# Patient Record
Sex: FEMALE | Race: WHITE | NOT HISPANIC OR LATINO | URBAN - METROPOLITAN AREA
[De-identification: names, ages, dates, MRNs, and addresses within clinical notes are randomized per-mention and may not be internally consistent; named-entity substitution may affect disease eponyms.]

---

## 2020-04-21 ENCOUNTER — EMERGENCY (EMERGENCY)
Facility: HOSPITAL | Age: 50
LOS: 0 days | Discharge: HOME | End: 2020-04-21
Attending: EMERGENCY MEDICINE
Payer: COMMERCIAL

## 2020-04-21 VITALS
SYSTOLIC BLOOD PRESSURE: 180 MMHG | HEART RATE: 75 BPM | TEMPERATURE: 98 F | OXYGEN SATURATION: 100 % | RESPIRATION RATE: 17 BRPM | DIASTOLIC BLOOD PRESSURE: 99 MMHG

## 2020-04-21 VITALS
RESPIRATION RATE: 18 BRPM | SYSTOLIC BLOOD PRESSURE: 182 MMHG | DIASTOLIC BLOOD PRESSURE: 109 MMHG | HEART RATE: 110 BPM | OXYGEN SATURATION: 100 % | TEMPERATURE: 98 F
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
## 2020-04-21 DIAGNOSIS — Z23 ENCOUNTER FOR IMMUNIZATION: ICD-10-CM

## 2020-04-21 DIAGNOSIS — S51.851A OPEN BITE OF RIGHT FOREARM, INITIAL ENCOUNTER: ICD-10-CM
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
## 2020-04-21 DIAGNOSIS — S61.451A OPEN BITE OF RIGHT HAND, INITIAL ENCOUNTER: ICD-10-CM

## 2020-04-21 DIAGNOSIS — S51.852A OPEN BITE OF LEFT FOREARM, INITIAL ENCOUNTER: ICD-10-CM

## 2020-04-21 PROCEDURE — 12002 RPR S/N/AX/GEN/TRNK2.6-7.5CM: CPT

## 2020-04-21 PROCEDURE — 99283 EMERGENCY DEPT VISIT LOW MDM: CPT | Mod: 25

## 2020-04-21 PROCEDURE — 90715 TDAP VACCINE 7 YRS/> IM: CPT

## 2020-04-21 PROCEDURE — 90471 IMMUNIZATION ADMIN: CPT

## 2020-04-21 PROCEDURE — 99283 EMERGENCY DEPT VISIT LOW MDM: CPT

## 2020-04-21 PROCEDURE — 73090 X-RAY EXAM OF FOREARM: CPT | Mod: RT

## 2020-04-21 PROCEDURE — 73090 X-RAY EXAM OF FOREARM: CPT | Mod: 26,RT

## 2020-04-21 RX ORDER — OXYCODONE AND ACETAMINOPHEN 5; 325 MG/1; MG/1
1 TABLET ORAL ONCE
Refills: 0 | Status: DISCONTINUED | OUTPATIENT
Start: 2020-04-21 | End: 2020-04-21

## 2020-04-21 RX ORDER — TETANUS TOXOID, REDUCED DIPHTHERIA TOXOID AND ACELLULAR PERTUSSIS VACCINE, ADSORBED 5; 2.5; 8; 8; 2.5 [IU]/.5ML; [IU]/.5ML; UG/.5ML; UG/.5ML; UG/.5ML
0.5 SUSPENSION INTRAMUSCULAR ONCE
Refills: 0 | Status: COMPLETED | OUTPATIENT
Start: 2020-04-21 | End: 2020-04-21

## 2020-04-21 RX ADMIN — Medication 1 TABLET(S): at 19:22

## 2020-04-21 RX ADMIN — OXYCODONE AND ACETAMINOPHEN 1 TABLET(S): 5; 325 TABLET ORAL at 19:22

## 2020-04-21 RX ADMIN — TETANUS TOXOID, REDUCED DIPHTHERIA TOXOID AND ACELLULAR PERTUSSIS VACCINE, ADSORBED 0.5 MILLILITER(S): 5; 2.5; 8; 8; 2.5 SUSPENSION INTRAMUSCULAR at 19:21

## 2020-04-21 NOTE — ED ADULT NURSE NOTE - OBJECTIVE STATEMENT
Pt bit by own dogs, dogs UTD on vaccines, unknown when last tetanus was. Pt with deep bites to right arm and hand x 4 and left arm x 1, bleeding controlled pta.

## 2020-04-21 NOTE — ED PROVIDER NOTE - PATIENT PORTAL LINK FT
You can access the FollowMyHealth Patient Portal offered by [facility] by registering at the following website: [email or web address]. By joining [email or web address]’s FollowMyHealth portal, you will also be able to view your health information using other applications (apps) compatible with our system.

## 2020-04-21 NOTE — ED PROVIDER NOTE - MUSCULOSKELETAL, MLM
Spine appears normal, range of motion is not limited, no muscle or joint tenderness. Mild ttp to the R forearm. FROM of the b/l upper extremities.

## 2020-04-21 NOTE — ED PROVIDER NOTE - CARE PLAN
Principal Discharge DX:	Dog bite, initial encounter  Secondary Diagnosis:	Puncture wound of right forearm, initial encounter

## 2020-04-21 NOTE — ED PROCEDURE NOTE - PROCEDURE ADDITIONAL DETAILS
2 gaping wounds. loosely closed with 3 stitches to one wound and 2 wounds to the other dog bite. Applied xeroform, nonadhesive dressing, pressure dressing and wrapped with kerlix.

## 2020-04-21 NOTE — ED ADULT TRIAGE NOTE - CHIEF COMPLAINT QUOTE
pt presents to Ed due to several dog bites  right forearm dog bite pt states both dogs are UTD with vaccines

## 2020-04-21 NOTE — ED PROVIDER NOTE - PROGRESS NOTE DETAILS
Zaki Ambriz for attending Dr. Fragoso: 48 y/o female with no significant PMHx presents to the ED s/p dog bite to b/l arms. Pt states she has two female dogs that got into a fight over a over and  bit her on the R forearm, hand and the L forearm. Last Tdap unknown. Vaccines for her dogs are UTD. Plan: laceration repair, update Tdap.

## 2020-04-21 NOTE — ED PROVIDER NOTE - OBJECTIVE STATEMENT
48 yo female with no significant PMH presents with dog bite to the b/l arms. Pt states she has 2 female dogs that got into a fight over a toy and bit her on the R forarm, hand and the L forearm. Last tdap unknown. Vaccines for her dogs are utd.

## 2020-04-21 NOTE — ED ADULT NURSE REASSESSMENT NOTE - NS ED NURSE REASSESS COMMENT FT1
report received from outgoing RN Dee for continuity of care. VSS, calm and cooperative. Medicated as ordered. Pending XR results and stitches for her injuries. Safety maintained, needs attended, will continue to monitor.

## 2020-04-21 NOTE — ED PROVIDER NOTE - ATTENDING CONTRIBUTION TO CARE
I, Estefani Fragoso MD,  performed the initial face to face bedside interview with this patient regarding history of present illness, review of symptoms and relevant past medical, social and family history.  I completed an independent physical examination.  I was the initial provider who evaluated this patient. I have signed out the follow up of any pending tests (i.e. labs, radiological studies) to the ACP.  I have communicated the patient’s plan of care and disposition with the ACP.  The history, relevant review of systems, past medical and surgical history, medical decision making, and physical examination was documented by the scribe in my presence and I attest to the accuracy of the documentation.

## 2020-04-21 NOTE — ED PROVIDER NOTE - SKIN WOUND TYPE
PUNCTURE WOUND(S)/multiple dogs bite to the R hand and forearm with a gaping wound on the volar R forearm.